# Patient Record
Sex: FEMALE | Race: OTHER | Employment: STUDENT | URBAN - METROPOLITAN AREA
[De-identification: names, ages, dates, MRNs, and addresses within clinical notes are randomized per-mention and may not be internally consistent; named-entity substitution may affect disease eponyms.]

---
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
## 2024-12-11 ENCOUNTER — OFFICE VISIT (OUTPATIENT)
Dept: FAMILY MEDICINE CLINIC | Facility: CLINIC | Age: 19
End: 2024-12-11

## 2024-12-11 VITALS
TEMPERATURE: 97 F | BODY MASS INDEX: 26.65 KG/M2 | HEIGHT: 61.5 IN | DIASTOLIC BLOOD PRESSURE: 76 MMHG | SYSTOLIC BLOOD PRESSURE: 122 MMHG | HEART RATE: 98 BPM | WEIGHT: 143 LBS

## 2024-12-11 DIAGNOSIS — Z01.419 WELL WOMAN EXAM WITH ROUTINE GYNECOLOGICAL EXAM: Primary | ICD-10-CM

## 2024-12-11 DIAGNOSIS — D22.9 CHANGE IN MULTIPLE NEVI: ICD-10-CM

## 2024-12-11 DIAGNOSIS — L90.5 ACNE SCARRING: ICD-10-CM

## 2024-12-11 DIAGNOSIS — Z30.09 ENCOUNTER FOR OTHER GENERAL COUNSELING OR ADVICE ON CONTRACEPTION: ICD-10-CM

## 2024-12-11 DIAGNOSIS — Z12.4 SCREENING FOR CERVICAL CANCER: ICD-10-CM

## 2024-12-11 PROBLEM — Z30.9 CONTRACEPTIVE MANAGEMENT: Status: ACTIVE | Noted: 2024-12-11

## 2024-12-11 PROCEDURE — 87591 N.GONORRHOEAE DNA AMP PROB: CPT | Performed by: NURSE PRACTITIONER

## 2024-12-11 PROCEDURE — 88175 CYTOPATH C/V AUTO FLUID REDO: CPT | Performed by: NURSE PRACTITIONER

## 2024-12-11 PROCEDURE — 81514 NFCT DS BV&VAGINITIS DNA ALG: CPT | Performed by: NURSE PRACTITIONER

## 2024-12-11 PROCEDURE — 87491 CHLMYD TRACH DNA AMP PROBE: CPT | Performed by: NURSE PRACTITIONER

## 2024-12-12 LAB
BV BACTERIA DNA VAG QL NAA+PROBE: NEGATIVE
C GLABRATA DNA VAG QL NAA+PROBE: NEGATIVE
C KRUSEI DNA VAG QL NAA+PROBE: NEGATIVE
C TRACH DNA SPEC QL NAA+PROBE: NEGATIVE
CANDIDA DNA VAG QL NAA+PROBE: NEGATIVE
N GONORRHOEA DNA SPEC QL NAA+PROBE: NEGATIVE
T VAGINALIS DNA VAG QL NAA+PROBE: NEGATIVE

## 2024-12-12 NOTE — PROGRESS NOTES
HPI  Pt presents to establish care and for physical .   Feeling well without complaints.    Takes mvi daily    Diet-fair-has been eating out more lately  Exercise-none  Sleep-well rested    Works in [employer]-is doing a lot of overtime right now     Periods-regular  Has never had pap  Is sexually active-no using contraception.        Family medical history-mgm-diabetes, mat aunt-breast ca; PGF-kidney stones    Has a few nevi that seem to be getting bigger-a couple on shoulder, one under chin and one on right breast.    Has significant acne that has left her w a lot of scarring on cheeks. Is currently using cleanser, moisturizer and retinal cream.     Review of Systems   Constitutional:  Negative for activity change, appetite change, fatigue, fever and unexpected weight change.   HENT:  Negative for congestion, ear pain, rhinorrhea and sneezing.    Eyes:  Negative for pain, redness and visual disturbance.   Respiratory:  Negative for cough, chest tightness, shortness of breath and wheezing.    Cardiovascular:  Negative for chest pain and palpitations.   Gastrointestinal:  Negative for abdominal distention, abdominal pain, constipation, diarrhea, nausea and vomiting.   Genitourinary:  Negative for dysuria, menstrual problem, pelvic pain, vaginal discharge and vaginal pain.   Musculoskeletal:  Negative for back pain, gait problem and myalgias.   Skin:  Negative for color change and rash.        Acne scarring on bilat cheeks; change in color and size of multiple nevu   Neurological:  Negative for dizziness, weakness and headaches.   Psychiatric/Behavioral:  Negative for decreased concentration and dysphoric mood. The patient is not nervous/anxious.        Vitals:    24 1817   BP: 122/76   Pulse: 98   Temp: 97 °F (36.1 °C)   Weight: 143 lb (64.9 kg)   Height: 5' 1.5\" (1.562 m)     Patient's last menstrual period was 2024 (exact date).  Body mass index is 26.58 kg/m².  Wt Readings from Last 6 Encounters:    12/11/24 143 lb (64.9 kg)      BP Readings from Last 5 Encounters:   12/11/24 122/76       Health Maintenance   Topic Date Due    Annual Physical  Never done    MMR Vaccines (1 of 1 - Standard series) Never done    Varicella Vaccines (1 of 2 - 13+ 2-dose series) Never done    HPV Vaccines (1 - 3-dose series) Never done    DTaP,Tdap,and Td Vaccines (1 - Tdap) Never done    Hepatitis B Vaccines (1 of 3 - 19+ 3-dose series) Never done    Annual Depression Screening  Never done    Pap Smear  Never done    COVID-19 Vaccine (1 - 2024-25 season) Never done    Influenza Vaccine (1) Never done    Pneumococcal Vaccine: Birth to 64yrs  Aged Out    Hepatitis A Vaccines  Aged Out    Meningococcal Vaccine  Aged Out       Patient's last menstrual period was 11/21/2024 (exact date).    History reviewed. No pertinent past medical history.    .History reviewed. No pertinent surgical history.    History reviewed. No pertinent family history.    Social History     Socioeconomic History    Marital status: Single     Spouse name: Not on file    Number of children: Not on file    Years of education: Not on file    Highest education level: Not on file   Occupational History    Not on file   Tobacco Use    Smoking status: Never    Smokeless tobacco: Never   Vaping Use    Vaping status: Never Used   Substance and Sexual Activity    Alcohol use: Yes     Alcohol/week: 5.0 standard drinks of alcohol     Types: 5 Shots of liquor per week    Drug use: Never    Sexual activity: Not on file   Other Topics Concern    Not on file   Social History Narrative    Not on file     Social Drivers of Health     Financial Resource Strain: Not on file   Food Insecurity: Not on file   Transportation Needs: Not on file   Physical Activity: Not on file   Stress: Not on file   Social Connections: Not on file   Housing Stability: Not on file       No current outpatient medications on file.       Allergies:  Allergies[1]    Physical Exam  Vitals and nursing note  reviewed.   Constitutional:       General: She is not in acute distress.     Appearance: She is well-developed.   HENT:      Head: Normocephalic and atraumatic.      Right Ear: Tympanic membrane, ear canal and external ear normal.      Left Ear: Tympanic membrane, ear canal and external ear normal.      Nose: Nose normal. No congestion or rhinorrhea.      Mouth/Throat:      Mouth: Mucous membranes are moist.      Pharynx: Oropharynx is clear. No oropharyngeal exudate or posterior oropharyngeal erythema.      Comments: Tonsils 1+ bilat  Eyes:      General:         Right eye: No discharge.         Left eye: No discharge.      Conjunctiva/sclera: Conjunctivae normal.      Pupils: Pupils are equal, round, and reactive to light.   Neck:      Thyroid: No thyromegaly.   Cardiovascular:      Rate and Rhythm: Normal rate and regular rhythm.      Heart sounds: Normal heart sounds. No murmur heard.  Pulmonary:      Effort: Pulmonary effort is normal. No respiratory distress.      Breath sounds: Normal breath sounds. No wheezing or rales.   Chest:      Chest wall: No tenderness.   Breasts:     Right: Normal.      Left: Normal.      Comments: Instructed on sbe  Abdominal:      General: Bowel sounds are normal. There is no distension.      Palpations: Abdomen is soft.      Tenderness: There is no abdominal tenderness.   Genitourinary:     General: Normal vulva.      Exam position: Lithotomy position.      Labia:         Right: No rash, tenderness, lesion or injury.         Left: No rash, tenderness, lesion or injury.       Urethra: No prolapse, urethral pain, urethral swelling or urethral lesion.      Vagina: Normal.      Cervix: Normal.      Uterus: Normal.       Adnexa: Right adnexa normal and left adnexa normal.      Rectum: Normal.      Comments: Ovaries not palp bilat  Musculoskeletal:         General: No tenderness. Normal range of motion.      Cervical back: Normal range of motion and neck supple.   Lymphadenopathy:       Cervical: No cervical adenopathy.      Upper Body:      Right upper body: No supraclavicular, axillary or pectoral adenopathy.      Left upper body: No supraclavicular, axillary or pectoral adenopathy.   Skin:     General: Skin is warm and dry.      Findings: No rash.          Neurological:      Mental Status: She is alert and oriented to person, place, and time.      Coordination: Coordination normal.   Psychiatric:         Behavior: Behavior normal.         Thought Content: Thought content normal.         Judgment: Judgment normal.         Assessment and Plan:  Problem List Items Addressed This Visit       Acne scarring     Continue present management  Refer derm         Relevant Orders    DERM - INTERNAL    Change in multiple nevi     Refer derm         Relevant Orders    DERM - INTERNAL    Contraceptive management     Discussed importance of using consistent birth control to avoid unwanted pregnancy  Discuss types of birth control options, pros and cons of each  Advised to follow up with further questions or if she would like to start a prescribed method.   Encourage safe sex practices         Screening for cervical cancer     Pap w hpv reflex, g/c  Vaginal c/s         Relevant Orders    ThinPrep Pap with HPV Reflex, Chlamydia/GC    Well woman exam with routine gynecological exam - Primary     Screening labs  Please aim to eat a diet high in fresh fruits and vegetables, lean protein sources, complex carbohydrates and limited processed and fast foods.  Try to get at least 150 minutes of exercise per week-a combination of weight resistance and cardio is preferred.    Encourage safe sex practices  Encourage pt to send in copy of vaccination records  Pap w hpv reflex, g/c, vaginal culture completed  Discussed birth control options  Encourage self breast exam monthly-instructed pt on how to perform self breast exam.          Relevant Orders    CBC, Platelet; No Differential    Comp Metabolic Panel (14)    Hemoglobin  A1C    Lipid Panel    TSH W Reflex To Free T4    Vitamin B12    Vitamin D    ThinPrep Pap with HPV Reflex, Chlamydia/GC    Chlamydia/Gc Amplification    Vaginitis Vaginosis PCR Panel                   Discussed plan of care with pt and pt is in agreement.All questions answered. Pt to call with questions or concerns.    Encouraged to sign up for My Chart if not already registered.     Note to patient and family:  The 21st Century Cures Act makes medical notes available to patients in the interest of transparency.  However, please be advised that this is a medical document.  It is intended as a peer to peer communication.  It is written in medical language and may contain abbreviations or verbiage that are technical and unfamiliar.  It may appear blunt or direct.  Medical documents are intended to carry relevant information, facts as evident, and the clinical opinion of the practitioner.         [1] Not on File

## 2024-12-12 NOTE — ASSESSMENT & PLAN NOTE
Screening labs  Please aim to eat a diet high in fresh fruits and vegetables, lean protein sources, complex carbohydrates and limited processed and fast foods.  Try to get at least 150 minutes of exercise per week-a combination of weight resistance and cardio is preferred.    Encourage safe sex practices  Encourage pt to send in copy of vaccination records  Pap w hpv reflex, g/c, vaginal culture completed  Discussed birth control options  Encourage self breast exam monthly-instructed pt on how to perform self breast exam.

## 2024-12-12 NOTE — ASSESSMENT & PLAN NOTE
Discussed importance of using consistent birth control to avoid unwanted pregnancy  Discuss types of birth control options, pros and cons of each  Advised to follow up with further questions or if she would like to start a prescribed method.   Encourage safe sex practices

## 2024-12-12 NOTE — PATIENT INSTRUCTIONS
LAB PREPARATION    -nothing to eat or drink for 10-12 hours prior to lab testing being done, except water  -please drink at least one large glass of water prior to coming in for labs to be drawn    Sivan Zuniga-Greenfield  Address: [address] [facility], [address] [place] [id number]  Hours:   Monday - Friday 7:30am - 4 pm  Saturday 6:30am - 2 pm    Phone: [phone]